# Patient Record
Sex: FEMALE | Race: WHITE | NOT HISPANIC OR LATINO
[De-identification: names, ages, dates, MRNs, and addresses within clinical notes are randomized per-mention and may not be internally consistent; named-entity substitution may affect disease eponyms.]

---

## 2024-01-01 ENCOUNTER — APPOINTMENT (OUTPATIENT)
Dept: GYNECOLOGIC ONCOLOGY | Facility: CLINIC | Age: 84
End: 2024-01-01

## 2024-01-01 ENCOUNTER — APPOINTMENT (OUTPATIENT)
Dept: HEMATOLOGY ONCOLOGY | Facility: CLINIC | Age: 84
End: 2024-01-01

## 2024-01-01 ENCOUNTER — RESULT REVIEW (OUTPATIENT)
Age: 84
End: 2024-01-01

## 2024-01-01 ENCOUNTER — NON-APPOINTMENT (OUTPATIENT)
Age: 84
End: 2024-01-01

## 2024-01-01 VITALS
SYSTOLIC BLOOD PRESSURE: 134 MMHG | DIASTOLIC BLOOD PRESSURE: 66 MMHG | RESPIRATION RATE: 16 BRPM | HEART RATE: 70 BPM | OXYGEN SATURATION: 96 % | TEMPERATURE: 98.6 F

## 2024-01-01 DIAGNOSIS — Z78.9 OTHER SPECIFIED HEALTH STATUS: ICD-10-CM

## 2024-01-01 DIAGNOSIS — C54.1 MALIGNANT NEOPLASM OF ENDOMETRIUM: ICD-10-CM

## 2024-01-01 DIAGNOSIS — Z87.42 PERSONAL HISTORY OF OTHER DISEASES OF THE FEMALE GENITAL TRACT: ICD-10-CM

## 2024-01-01 DIAGNOSIS — I10 ESSENTIAL (PRIMARY) HYPERTENSION: ICD-10-CM

## 2024-01-01 DIAGNOSIS — I25.10 ATHEROSCLEROTIC HEART DISEASE OF NATIVE CORONARY ARTERY W/OUT ANGINA PECTORIS: ICD-10-CM

## 2024-01-01 DIAGNOSIS — I63.9 CEREBRAL INFARCTION, UNSPECIFIED: ICD-10-CM

## 2024-01-01 DIAGNOSIS — Z87.891 PERSONAL HISTORY OF NICOTINE DEPENDENCE: ICD-10-CM

## 2024-01-01 DIAGNOSIS — E11.9 TYPE 2 DIABETES MELLITUS W/OUT COMPLICATIONS: ICD-10-CM

## 2024-01-01 DIAGNOSIS — D64.9 ANEMIA, UNSPECIFIED: ICD-10-CM

## 2024-01-01 DIAGNOSIS — E11.628 TYPE 2 DIABETES MELLITUS WITH OTHER SKIN COMPLICATIONS: ICD-10-CM

## 2024-02-01 PROBLEM — Z00.00 ENCOUNTER FOR PREVENTIVE HEALTH EXAMINATION: Status: ACTIVE | Noted: 2024-01-01

## 2024-02-06 NOTE — PHYSICAL EXAM
[General Appearance - Well Developed] : well developed [Sclera] : the sclera and conjunctiva were normal [] : no respiratory distress [Nondistended] : nondistended [Normal] : oriented to person, place and time, the affect was normal, the mood was normal and not anxious

## 2024-02-06 NOTE — REVIEW OF SYSTEMS
[Fatigue: Grade 1 - Fatigue relieved by rest] : Fatigue: Grade 1 - Fatigue relieved by rest [Diarrhea: Grade 0] : Diarrhea: Grade 0 [Urinary Tract Pain: Grade 0] : Urinary Tract Pain: Grade 0 [Urinary Urgency: Grade 0] : Urinary Urgency: Grade 0 [Urinary Frequency: Grade 0] : Urinary Frequency: Grade 0 [Genital Edema: Grade 0] : Genital Edema: Grade 0

## 2024-02-06 NOTE — DISEASE MANAGEMENT
[Clinical] : TNM Stage: c [TTNM] : x [NTNM] : x [MTNM] : x [N/A] : Currently not applicable [de-identified] : Pelvis 2 of 5 to 800 cGY

## 2024-02-06 NOTE — HISTORY OF PRESENT ILLNESS
[FreeTextEntry1] : 84F who presented to the ED with righ-sided hemiparesis. CT head revealed a stroke. The patient was started on anti-coagulation and developed heavy vaginal bleeding.  Imaging of the pelvis revealed an enhancing endometrial mass.  The patient was evaluated by Dr. Victoria. On her exam she noted: ext genitalia no lesions, right mid-thigh with skin abrasion. foul odor from vagina with small pinkish/red discharge. BME with small uterus. no adnexal masses. consent obtained and embx performed. speculum introduced, normal vaginal walls, thin malodorus tumor discharge with blood in vault, small clots noted. cervix normal appearing, no visible tumor of vagina or cervix. cervix cleansed with betadine. anterior lip grasped with tenaculum, pipelle sounded to 7cm and adequate tissue sample obtained with single pass. specimen sent for pathology. tenaculum removed, monsels applied. good hemostasis. patient tolerated procedure well  2/7/24 FX 2 of 5 today.  The patient is currently admitted and is feeling well

## 2024-02-13 PROBLEM — Z87.891 FORMER SMOKER: Status: ACTIVE | Noted: 2024-01-01

## 2024-02-13 PROBLEM — D64.9 NORMOCYTIC ANEMIA: Status: ACTIVE | Noted: 2024-01-01

## 2024-02-13 PROBLEM — C54.1 ENDOMETRIAL CANCER DETERMINED BY UTERINE BIOPSY: Status: ACTIVE | Noted: 2024-01-01

## 2024-02-13 PROBLEM — E11.628 TYPE 2 DIABETES MELLITUS WITH OTHER SKIN COMPLICATION: Status: ACTIVE | Noted: 2024-01-01

## 2024-02-13 PROBLEM — I10 HTN (HYPERTENSION): Status: ACTIVE | Noted: 2024-01-01

## 2024-02-13 PROBLEM — I63.9 CVA (CEREBRAL VASCULAR ACCIDENT): Status: ACTIVE | Noted: 2024-01-01

## 2024-02-13 PROBLEM — I25.10 CAD (CORONARY ARTERY DISEASE): Status: ACTIVE | Noted: 2024-01-01

## 2024-02-13 PROBLEM — E11.9 DIABETES MELLITUS, TYPE 2: Status: ACTIVE | Noted: 2024-01-01

## 2024-02-13 PROBLEM — Z87.42 HISTORY OF VAGINAL BLEEDING: Status: RESOLVED | Noted: 2024-01-01 | Resolved: 2024-01-01

## 2024-02-13 PROBLEM — Z78.9 NO FAMILY HISTORY OF CANCER: Status: ACTIVE | Noted: 2024-01-01

## 2024-02-13 NOTE — HISTORY OF PRESENT ILLNESS
[de-identified] : Ms. Ijeoam Melendez is 84 years old female with endometrial cancer here for consultation.   Patient with hx of DM, HTN, CAD s/p stents, h/o carotid artery disease, presented to ER on 1/27/2024 for right arm/leg weakness that started 1-26 per son (pt unclear how long present). Admitted for suspected CVA. Pt was on baby aspirin and Plavix at home; given full dose aspirin and Plavix. Developed vaginal bleeding 1/28/2024; antiplatelets held; seen by gyn, clots removed from introitus. Hospital course complicated by vaginal bleeding s/p biopsy which confirmed endometrial carcinoma. Hospital further course further complicated by sepsis overnight 2/4.  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 coli bacteremia with intravenous antibiotics and was switched over to Cipro as per sensitivities.  Patient was started on radiation treatment for her vaginal bleeding and completed 5 days of radiation in the hospital.  Patient did not have significant vaginal bleeding since the beginning of radiation.  atient was noted to have left-sided carotid stenosis 70% correlating with his acute stroke.  Vascular surgeon was consulted patient will need carotid endarterectomy before any surgical treatment for endometrial cancer.  Patient then transferred to [facility] 2/12/2024 1/27/2024 CTA  neck/Head 1.  Thrombosis of the RIGHT internal carotid artery from the carotidbifurcation to the distal supraclinoid segment with reconstitution distally. 2.  Severe focal stenosis of the proximal LEFT internal carotid artery due to calcified plaques. 3.  Severe stenosis of the RIGHT vertebral artery ostium. 4.  No other areas of hemodynamically significant large vessel stenosis or occlusion in the head and neck.  CT Head -  No acute transcortical infarction, hemorrhage, or mass effect. MRI head - acute infarcts  MRI pelvis  -  Enhancing 3.1 cm mass distending the endometrial cavity suspicious for primary endometrial neoplasm. Products of hemorrhage within the cervical canal and upper vagina. Left obturator lymphadenopathy 2.4 x 1.6 cm  CT chest  No evidence of metastatic disease within the chest. 1.4 cm stone within the right renal pelvis. No hydronephrosis.  CT A/P Air within the urinary bladder wall suspicious for emphysematous cystitis. Asymmetric right perinephric fat stranding, cannot exclude pyelonephritis without IV contrast. Correlation with urinalysis/urine culture is recommended. Nonobstructing 1.7 cm right renal pelvis stone. No hydronephrosis.  1/31/2024  . 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 They are negative for WT1 expression. This supports the diagnosis. MLH-1               Intact nuclear expression MSH-2              Intact nuclear expression MSH-6              Intact nuclear expression PMS-2              Intact nuclear expression Interpretation:  Mismatch Repair Protein Panel Normal.  Low probability of MSI-H.  Denies any family history of hematological and/or oncological disorders  SocialHx: former smoker no alcohol intake Denies any illicit drugs Lives

## 2024-02-23 NOTE — HISTORY OF PRESENT ILLNESS
[FreeTextEntry1] :  84yo here for follow up  Dx:  Sx: Adj tx: s/p hemostatic EBRT (2/5/24-2/9/24)  Hospital course:  1/27/24: admitted with right sided weakness suspected CVA, on ASA and plavix, developed VB and EMBx confirmed endometrial carcinoma.  2/4/24: pt with sepsis 2' emphysematous cystitis/pyelonephritis with E-Coli bacteremia, given IV antibx and switched to Cipro 2/5-2/9/24: EBRT, bleeding controlled Pt noted to have left sided carotid stenosis 70%, vascular consult recs endarterectomy prior to surgical treatment for endometrial cancer 2/12/24: transferred to [place] [facility]  Since last visit patient reports doing well. She was transferred to [facility] [facility] 2/12/24. She denies pain/fever/bleeding/bloating. She has normal activity tolerance and normal appetite. Reports normal urination and BMs.   Labs: 1/31/24: A. ENDOMETRIAL BIOPSY FINAL PATHOLOGIC DIAGNOSIS A. 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 They are negative for WT1 expression. This supports the diagnosis. - See comment  RESULTS OF MISMATCH REPAIR (MMR) TESTING BY IMMUNOHISTOCHEMISTRY ON THE PATIENT'S ADENOCARCINOMA (Block A1)  MLH-1               Intact nuclear expression MSH-2              Intact nuclear expression MSH-6              Intact nuclear expression PMS-2              Intact nuclear expression Background non-neoplastic tissue/internal control shows intact nuclear expression  Interpretation:  Mismatch Repair Protein Panel Normal.  Low probability of MSI-H.  Results indicate a low probability of Mahoney syndrome.  However if clinical suspicion for Mahoney syndrome is high, referral to genetic counseling should be considered.  Diagnosis Comment  An immunohistochemical stain for Her2 is pending. The results will be reported in an addendum.   Imaging: CT abdomen pelvis; CT chest 2/6/2024: Findings: Chest: Lungs and large airways: Patent central airways.  Right lower lobe passive/did pendant atelectasis.  Punctate calcified granuloma in the right lower lobe. Pleura: Trace bilateral pleural effusions right greater than left, elevated right hemidiaphragm. Vessels: Atherosclerotic calcifications of the thoracic aorta, aortic valve, coronary arteries, aortic arch branches. Heart: Heart size is normal.  Trace pericardial effusion.  Aortic valve calcification. Mediastinum and jessika: No lymphadenopathy Chest wall and lower neck: Bilateral breast calcifications. Abdomen/pelvis: Liver: Within normal limits Bile ducts: Normal caliber Gallbladder: Cholelithiasis Spleen: Within normal limits Gross: Thinning/atrophy of the pancreatic body and tail. Adrenals: Within normal limits Kidneys/ureters: 1.7 cm stone in the right renal pelvis no hydronephrosis bilaterally.  However, there is asymmetric right perinephric fat stranding compared to the left kidney.  2.7 cm exophytic right lower pole renal cyst. Bladder: Distended urinary bladder containing air within the urinary bladder wall. Reproductive organs: Enlarged uterus. Bowel: No bowel obstruction.  Appendix is not visualized.  No evidence of inflammation in the pericecal region.  Moderate amount of colonic stool including a distended stool-filled rectum measuring 5.1 cm suggesting constipation, correlate clinically. Peritoneum: No ascites Vessels: Course severe arthrosclerotic calcifications of the abdominal aorta, bilateral iliac and femoral arteries and mesenteric branches.  There is a severe stenosis and origin of right renal artery, moderate stenosis left renal artery, moderate stenosis origin of celiac axis and mild stenosis origin of the SMA secondary to coarse arthrosclerotic calcifications. Retroperitoneum/lymph nodes: No lymphadenopathy Abdominal wall: Pelvic subcutaneous edema Bones: Multilevel degenerative changes.  Grade 1 anteriolisthesis L3 over L4 and L4 over L5.  Osteopenia.  Moderate Issac arthritis both hips.  1.2 cm bone island in the left scapula.  Advanced DJD left AC joint and moderate DJD right glenohumeral joint.  Calcification within the right bicipital groove. Impression: Air within the urinary bladder wall suspicious for emphysematous cystitis.  Asymmetric right perinephric fat stranding, cannot exclude pyelonephritis without IV contrast.  Correlation with urinalysis/urine culture is recommended.  Nonobstructing 1.7 cm right renal pelvis stone.  No hydronephrosis. Enlarged uterus. Cholelithiasis. Additional findings noted above

## 2024-03-05 ENCOUNTER — NON-APPOINTMENT (OUTPATIENT)
Age: 84
End: 2024-03-05

## 2024-03-20 ENCOUNTER — TRANSCRIPTION ENCOUNTER (OUTPATIENT)
Age: 84
End: 2024-03-20